# Patient Record
Sex: FEMALE | Race: WHITE | URBAN - METROPOLITAN AREA
[De-identification: names, ages, dates, MRNs, and addresses within clinical notes are randomized per-mention and may not be internally consistent; named-entity substitution may affect disease eponyms.]

---

## 2021-08-11 ENCOUNTER — OFFICE VISIT (OUTPATIENT)
Dept: URBAN - METROPOLITAN AREA CLINIC 48 | Facility: CLINIC | Age: 81
End: 2021-08-11
Payer: MEDICARE

## 2021-08-11 DIAGNOSIS — H11.31 SUBCONJUNCTIVAL HEMORRHAGE OF RIGHT EYE: Primary | ICD-10-CM

## 2021-08-11 PROCEDURE — 99204 OFFICE O/P NEW MOD 45 MIN: CPT | Performed by: OPTOMETRIST

## 2021-08-11 ASSESSMENT — INTRAOCULAR PRESSURE
OD: 9
OS: 10

## 2021-08-11 NOTE — IMPRESSION/PLAN
Impression: Subconjunctival hemorrhage of right eye: H11.31.
sed rate 4 CRP less than 5 Plan: Patient was referred by PCP to ER for possible temporal arteritis. ER does not suspect temporal arteritis. Referred to our office for subconj Hemorrhage in the right eye. Posterior segment normal. Discussed with the patient this does not affect vision or cause pain. This should resolve over time. 

RTC 2 week for follow up with Dr. Chao Ro

## 2021-08-17 ENCOUNTER — OFFICE VISIT (OUTPATIENT)
Dept: URBAN - METROPOLITAN AREA CLINIC 48 | Facility: CLINIC | Age: 81
End: 2021-08-17
Payer: MEDICARE

## 2021-08-17 PROCEDURE — 92004 COMPRE OPH EXAM NEW PT 1/>: CPT | Performed by: STUDENT IN AN ORGANIZED HEALTH CARE EDUCATION/TRAINING PROGRAM

## 2021-08-17 RX ORDER — CYCLOSPORINE 0.5 MG/ML
0.05 % EMULSION OPHTHALMIC
Qty: 10 | Refills: 3 | Status: ACTIVE
Start: 2021-08-17

## 2021-08-17 ASSESSMENT — INTRAOCULAR PRESSURE
OD: 10
OS: 9

## 2021-08-17 NOTE — IMPRESSION/PLAN
Impression: Unspecified visual loss: H54.7. Plan: Episodes of transient vision loss following fall and hitting head. Patient reports recurrent episodes of dizziness, acute evaluation done at hospital,  no etiology found, will add addition STAT work up to be done within one wk. STAT Ordered: Carotid Echo and MRI of brain (hand order written) -Labs: required for imaging BUN & Creatine (generated, printed and provided for pt. to hand carry) Pt. knows to f/u with cardio ASAP

RTC precautions reviewed.

## 2021-08-17 NOTE — IMPRESSION/PLAN
Impression: Dry eye syndrome of bilateral lacrimal glands: H04.123. Plan: Patient reports no relief w/PF Refresh and improvement previously w/Restasis.  

-RX for Restasis sent to pharmacy today

## 2021-09-20 ENCOUNTER — OFFICE VISIT (OUTPATIENT)
Dept: URBAN - METROPOLITAN AREA CLINIC 48 | Facility: CLINIC | Age: 81
End: 2021-09-20
Payer: MEDICARE

## 2021-09-20 DIAGNOSIS — H04.123 DRY EYE SYNDROME OF BILATERAL LACRIMAL GLANDS: Primary | ICD-10-CM

## 2021-09-20 DIAGNOSIS — H54.7 UNSPECIFIED VISUAL LOSS: ICD-10-CM

## 2021-09-20 PROCEDURE — 92004 COMPRE OPH EXAM NEW PT 1/>: CPT | Performed by: STUDENT IN AN ORGANIZED HEALTH CARE EDUCATION/TRAINING PROGRAM

## 2021-09-20 ASSESSMENT — INTRAOCULAR PRESSURE
OD: 11
OS: 10

## 2021-09-20 NOTE — IMPRESSION/PLAN
HISTORY:

medical clearance  



COMPARISON:

Chest radiograph dated 06/02/2018. 



FINDINGS:



LUNGS:

No active pulmonary disease.



PLEURA:

No significant pleural effusion identified, no pneumothorax apparent.



CARDIOVASCULAR:

Cardiomediastinal silhouette stably enlarged.



OSSEOUS STRUCTURES:

Unchanged.



VISUALIZED UPPER ABDOMEN:

Normal.



OTHER FINDINGS:

None.



IMPRESSION:

No active disease. Impression: Unspecified visual loss: H54.7. Plan: Work up by cardiologist completed, per patients report no issues identified,  MRI unremarkable, continue to monitor, f/u one month. RTC precautions reviewed.

## 2021-09-20 NOTE — IMPRESSION/PLAN
Impression: Dry eye syndrome of bilateral lacrimal glands: H04.123. Plan: Encouraged pt. to continue Restastis as it takes longer to become effective will re-check in one month. -If pt. desires she may use OTC AFT up to qid or PF AFT PRN along w/Restasis. (Patent knows to wait 3-5 min.  in between gtts.)


RTC 1 month (short exam)

## 2021-10-18 ENCOUNTER — OFFICE VISIT (OUTPATIENT)
Dept: URBAN - METROPOLITAN AREA CLINIC 48 | Facility: CLINIC | Age: 81
End: 2021-10-18
Payer: MEDICARE

## 2021-10-18 PROCEDURE — 92012 INTRM OPH EXAM EST PATIENT: CPT | Performed by: STUDENT IN AN ORGANIZED HEALTH CARE EDUCATION/TRAINING PROGRAM

## 2021-10-18 ASSESSMENT — INTRAOCULAR PRESSURE
OD: 12
OS: 12

## 2021-10-18 NOTE — IMPRESSION/PLAN
Impression: Dry eye syndrome of bilateral lacrimal glands: H04.123. Plan: OU Continue Restastis BID Continue OTC AFT up to qid or PF AFT PRN along w/Restasis. -Patent knows to wait 3-5 min. in between gtts. 


RTC 4-6 month for dry eye check with Dr. Amador Fairly